# Patient Record
Sex: MALE | Race: WHITE | NOT HISPANIC OR LATINO | URBAN - METROPOLITAN AREA
[De-identification: names, ages, dates, MRNs, and addresses within clinical notes are randomized per-mention and may not be internally consistent; named-entity substitution may affect disease eponyms.]

---

## 2017-11-16 ENCOUNTER — IMPORTED ENCOUNTER (OUTPATIENT)
Dept: URBAN - METROPOLITAN AREA CLINIC 31 | Facility: CLINIC | Age: 72
End: 2017-11-16

## 2017-11-16 PROBLEM — E11.9: Noted: 2017-11-16

## 2017-11-16 PROBLEM — D31.32: Noted: 2017-11-16

## 2017-11-16 PROBLEM — H25.813: Noted: 2017-11-16

## 2017-11-16 PROCEDURE — 92014 COMPRE OPH EXAM EST PT 1/>: CPT

## 2017-11-16 PROCEDURE — 92015 DETERMINE REFRACTIVE STATE: CPT

## 2017-11-16 PROCEDURE — 92250 FUNDUS PHOTOGRAPHY W/I&R: CPT

## 2017-11-16 NOTE — PATIENT DISCUSSION
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 Advise continued observation. Discussed in detail with the patient. The patient voiced understanding.

## 2017-11-16 NOTE — PATIENT DISCUSSION
DM II without sign of Diabetic Retinopathy OU:  Discussed the pathophysiology of diabetes and its effect on the eye. Stressed the importance of regular followup and good control of BS BP and Lipids to avoid future complications.

## 2017-11-16 NOTE — PATIENT DISCUSSION
Combined Types of Cataract OU: Explained how cataracts can effect vision. Recommend clinical observation. The patient was advised to contact us if any change or worsening of vision.

## 2019-02-11 ENCOUNTER — IMPORTED ENCOUNTER (OUTPATIENT)
Dept: URBAN - METROPOLITAN AREA CLINIC 31 | Facility: CLINIC | Age: 74
End: 2019-02-11

## 2019-02-11 PROBLEM — E11.9: Noted: 2019-02-11

## 2019-02-11 PROBLEM — H35.372: Noted: 2019-02-11

## 2019-02-11 PROBLEM — D31.32: Noted: 2019-02-11

## 2019-02-11 PROBLEM — H25.813: Noted: 2019-02-11

## 2019-02-11 PROCEDURE — 92015 DETERMINE REFRACTIVE STATE: CPT

## 2019-02-11 PROCEDURE — 92250 FUNDUS PHOTOGRAPHY W/I&R: CPT

## 2019-02-11 PROCEDURE — 92014 COMPRE OPH EXAM EST PT 1/>: CPT

## 2019-02-11 NOTE — PATIENT DISCUSSION
1. Combined Types of Cataract OU: Explained how cataracts can effect vision. Recommend clinical observation. The patient was advised to contact us if any change or worsening of vision. 2. 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 Advise continued observation. Discussed in detail with the patient. The patient voiced understanding. 3.  DM II without sign of Diabetic Retinopathy OU:  Discussed the pathophysiology of diabetes and its effect on the eye. Stressed the importance of regular followup and good control of BS BP and Lipids to avoid future complications. 4.   Epiretinal Membrane OS

## 2021-05-18 ENCOUNTER — APPOINTMENT (OUTPATIENT)
Dept: UROLOGY | Age: 76
End: 2021-05-18

## 2022-04-01 ASSESSMENT — TONOMETRY
OD_IOP_MMHG: 17
OD_IOP_MMHG: 16
OS_IOP_MMHG: 15
OS_IOP_MMHG: 17

## 2022-04-01 ASSESSMENT — VISUAL ACUITY
OD_SC: 20/50
OD_GLARE: 20/25MED
OS_GLARE: 20/40+2MED
OS_SC: 20/70-2
OS_PH: CC 20/40
OS_SC: 20/40
OD_CC: J214''
OD_PH: CC 20/25
OS_CC: J214''
OD_SC: 20/20+1